# Patient Record
(demographics unavailable — no encounter records)

---

## 2025-03-07 NOTE — ASSESSMENT
[FreeTextEntry1] : 27 y/o male with right knee pain over the last few weeks without any specific injury.  X-rays show patella ghanshyam and there is some instability or hypermobility of the patella on exam but no history of any dislocations.  He did have an incident many years ago that perhaps was a subluxation of the patella but got better. He could have a lateral meniscus tear or chondral injury given some of the symptoms he has with intermittent pain on twisting or pivoting and a slight catching intermittently. He will try some nonoperative treatment with an anti-inflammatory which I prescribed and physical therapy.  If pain is not improving then he will need an MRI to workup further and rule out a tear.  He should follow-up in about 4 to 6 weeks or sooner if it is getting worse.

## 2025-03-07 NOTE — HISTORY OF PRESENT ILLNESS
[de-identified] : 26 year old male who comes in for evaluation for RIGHT knee pain that started 2/17/25 without any injury. He works out often but denies any specific injury. He was seen at [facility] first on 2/23/2025 and then on 3/2/25 and had xrays that were negative for fracture or dislocation. He has intermittent pain walking and pivoting. He is better with rest. He has taken occasional Advil that has not provided any relief. He rates pain intermittent localized 6/10.  No history of significant injury or issue with this knee.  No swelling.  He does feel like the knee catches or briefly locks up at times with the pain more lateral in the knee He does desk work.  He is walking without assistive devices

## 2025-03-07 NOTE — PHYSICAL EXAM
[LE] : Sensory: Intact in bilateral lower extremities [Normal RLE] : Right Lower Extremity: No scars, rashes, lesions, ulcers, skin intact [Normal LLE] : Left Lower Extremity: No scars, rashes, lesions, ulcers, skin intact [Normal Touch] : sensation intact for touch [Normal] : Gait: normal [de-identified] : RIGHT knee Normal gait.  He can squat fully without pain.  When turning to change direction he had pain in the knee.  That was the last time he has any significant pain during the exam. Mildly tender lateral joint line. Negative Gris. Ia Lachman.  Negative anterior and posterior drawer.  Normal varus and valgus laxity.  Intact extensor mechanism. Knee range of motion 0 to 135 degrees flexion without pain or crepitus. Normal neurovascular exam distally [de-identified] : [place] was [place] [facility] [place] KNEE RIGHT PROCEDURE DATE: 03/02/2025 INTERPRETATION: Clinical indications: Knee pain. 3 views of the right knee. IMPRESSION: No evidence of acute fracture or dislocation. Joint spaces are preserved. No joint effusion. --- End of Report ---  I independently reviewed the x-rays which did not have a merchant view and more nonweightbearing.  It did show high patella New x-rays today weightbearing AP, lateral and merchant views show no fractures or bone lesions.  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  Minimal narrowing patellofemoral joint

## 2025-03-07 NOTE — PHYSICAL EXAM
[LE] : Sensory: Intact in bilateral lower extremities [Normal RLE] : Right Lower Extremity: No scars, rashes, lesions, ulcers, skin intact [Normal LLE] : Left Lower Extremity: No scars, rashes, lesions, ulcers, skin intact [Normal Touch] : sensation intact for touch [Normal] : Gait: normal [de-identified] : RIGHT knee Normal gait.  He can squat fully without pain.  When turning to change direction he had pain in the knee.  That was the last time he has any significant pain during the exam. Mildly tender lateral joint line. Negative Gris. Ia Lachman.  Negative anterior and posterior drawer.  Normal varus and valgus laxity.  Intact extensor mechanism. Knee range of motion 0 to 135 degrees flexion without pain or crepitus. Normal neurovascular exam distally [de-identified] : [place] [facility] [place] PROCEDURE DATE: 03/02/2025 INTERPRETATION: Clinical indications: Knee pain. 3 views of the right knee. IMPRESSION: No evidence of acute fracture or dislocation. Joint spaces are preserved. No joint effusion. --- End of Report ---  I independently reviewed the x-rays which did not have a merchant view and more nonweightbearing.  It did show high patella New x-rays today weightbearing AP, lateral and merchant views show no fractures or bone lesions.  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  Minimal narrowing patellofemoral joint

## 2025-03-07 NOTE — HISTORY OF PRESENT ILLNESS
[de-identified] : 26 year old male who comes in for evaluation for RIGHT knee pain that started 2/17/25 without any injury. He works out often but denies any specific injury. He was seen at [facility] first on 2/23/2025 and then on 3/2/25 and had xrays that were negative for fracture or dislocation. He has intermittent pain walking and pivoting. He is better with rest. He has taken occasional Advil that has not provided any relief. He rates pain intermittent localized 6/10.  No history of significant injury or issue with this knee.  No swelling.  He does feel like the knee catches or briefly locks up at times with the pain more lateral in the knee He does desk work.  He is walking without assistive devices